# Patient Record
(demographics unavailable — no encounter records)

---

## 2024-12-06 NOTE — HISTORY OF PRESENT ILLNESS
[de-identified] : This 36-year-old woman has a 3 to 4-month history of bilateral lower back pain.  There is no history of injury.  The pain does not radiate to her buttocks or legs.  She has not had lower extremity neurologic symptoms of numbness, paresthesias or weakness and there is no Valsalva effect.  She has not had night pain.  The pain is worse sitting, occasionally worse driving and to a lesser extent worse standing and walking.  She has been taking ibuprofen over-the-counter 800 mg twice a day without significant improvement.  Her past medical history and review of systems is negative with the exception of a prior appendectomy. [Pain Location] : pain [Worsening] : worsening [7] : a maximum pain level of 7/10 [Walking] : walking [Sitting] : sitting [Standing] : standing

## 2024-12-06 NOTE — DISCUSSION/SUMMARY
[Medication Risks Reviewed] : Medication risks reviewed [de-identified] : She will rest and use moist heat.  She has not made significant progress while taking ibuprofen 800 mg but only twice a day.  She has been switched to diclofenac 75 mg twice a day.  She will call if there are problems with the medication or worsening of her symptoms and I will see her for follow-up in 4 weeks.

## 2024-12-06 NOTE — PHYSICAL EXAM
[de-identified] : She is fully alert and oriented with a normal mood and affect.  She is in no acute distress as I take the history.  She ambulates with a normal gait including tiptoe and heel walking.  There are no antalgic or coxalgia components to her gait.  There are no cutaneous abnormalities or palpable bony defects of the spine.  There is no evidence of shortness of breath or respiratory distress.  There is no paravertebral muscle spasm, sciatic notch tenderness or trochanteric tenderness.  Forward flexion of the spine at 20 degrees causes lower back pain.  Her lower extremity neurological examination revealed trace to 1+ symmetrical reflexes.  Motor power is normal to manual testing all lower extremity groups and sensation is normal to light touch in all dermatomes.  Straight leg raising is negative to 90 degrees in the sitting position bilaterally.  Her hips and her knees have a full and painless range of motion with normal stability.  Vascular examination shows no evidence of varicosities and there is no lymphedema.